# Patient Record
Sex: FEMALE | URBAN - NONMETROPOLITAN AREA
[De-identification: names, ages, dates, MRNs, and addresses within clinical notes are randomized per-mention and may not be internally consistent; named-entity substitution may affect disease eponyms.]

---

## 2017-10-12 ENCOUNTER — APPOINTMENT (OUTPATIENT)
Age: 72
Setting detail: DERMATOLOGY
End: 2017-10-12

## 2017-10-12 DIAGNOSIS — B07.8 OTHER VIRAL WARTS: ICD-10-CM

## 2017-10-12 DIAGNOSIS — L72.8 OTHER FOLLICULAR CYSTS OF THE SKIN AND SUBCUTANEOUS TISSUE: ICD-10-CM

## 2017-10-12 DIAGNOSIS — L82.1 OTHER SEBORRHEIC KERATOSIS: ICD-10-CM

## 2017-10-12 PROBLEM — D48.5 NEOPLASM OF UNCERTAIN BEHAVIOR OF SKIN: Status: ACTIVE | Noted: 2017-10-12

## 2017-10-12 PROCEDURE — 11100: CPT | Mod: 59

## 2017-10-12 PROCEDURE — OTHER COUNSELING: OTHER

## 2017-10-12 PROCEDURE — 17110 DESTRUCT B9 LESION 1-14: CPT

## 2017-10-12 PROCEDURE — OTHER BIOPSY BY PUNCH METHOD: OTHER

## 2017-10-12 PROCEDURE — 11101: CPT

## 2017-10-12 PROCEDURE — 99202 OFFICE O/P NEW SF 15 MIN: CPT | Mod: 25

## 2017-10-12 PROCEDURE — OTHER LIQUID NITROGEN: OTHER

## 2017-10-12 PROCEDURE — OTHER BIOPSY BY SHAVE METHOD: OTHER

## 2017-10-12 ASSESSMENT — LOCATION ZONE DERM
LOCATION ZONE: NOSE
LOCATION ZONE: TRUNK
LOCATION ZONE: ARM
LOCATION ZONE: FACE

## 2017-10-12 ASSESSMENT — LOCATION DETAILED DESCRIPTION DERM
LOCATION DETAILED: UPPER STERNUM
LOCATION DETAILED: LEFT ANTERIOR SHOULDER
LOCATION DETAILED: NASAL DORSUM
LOCATION DETAILED: RIGHT LATERAL SUPERIOR CHEST
LOCATION DETAILED: LEFT VENTRAL PROXIMAL FOREARM
LOCATION DETAILED: LEFT FOREHEAD

## 2017-10-12 ASSESSMENT — LOCATION SIMPLE DESCRIPTION DERM
LOCATION SIMPLE: LEFT FOREARM
LOCATION SIMPLE: LEFT SHOULDER
LOCATION SIMPLE: NOSE
LOCATION SIMPLE: LEFT FOREHEAD
LOCATION SIMPLE: CHEST

## 2017-10-12 NOTE — HPI: SKIN LESION
How Severe Is Your Skin Lesion?: mild
Has Your Skin Lesion Been Treated?: not been treated
Is This A New Presentation, Or A Follow-Up?: Skin Lesions
Which Family Member (Optional)?: Brother

## 2017-10-12 NOTE — PROCEDURE: BIOPSY BY SHAVE METHOD
Electrodesiccation Text: The wound bed was treated with electrodesiccation after the biopsy was performed.
Biopsy Method: 15 blade
Billing Type: Third-Party Bill
Additional Anesthesia Volume In Cc (Will Not Render If 0): 0
Hemostasis: Aluminum Chloride
Anesthesia Type: 1% lidocaine without epinephrine
Bill For Surgical Tray: no
Anesthesia Volume In Cc (Will Not Render If 0): 0.5
Biopsy Type: H and E
Wound Care: Bacitracin
Detail Level: Detailed
Consent: Written consent was obtained and risks were reviewed including but not limited to scarring, infection, bleeding, scabbing, incomplete removal, nerve damage and allergy to anesthesia.
Electrodesiccation And Curettage Text: The wound bed was treated with electrodesiccation and curettage after the biopsy was performed.
Cryotherapy Text: The wound bed was treated with cryotherapy after the biopsy was performed.
Dressing: bandage
Silver Nitrate Text: The wound bed was treated with silver nitrate after the biopsy was performed.
Type Of Destruction Used: Curettage
Curettage Text: The wound bed was treated with curettage after the biopsy was performed.
Post-Care Instructions: I reviewed with the patient in detail post-care instructions. Patient is to keep the biopsy site dry overnight, and then apply bacitracin twice daily until healed. Patient may cleanse with half peroxide and half water mixture
Notification Instructions: Patient will be notified of biopsy results. However, patient instructed to call the office if not contacted within 2 weeks.

## 2017-10-12 NOTE — PROCEDURE: BIOPSY BY PUNCH METHOD
Detail Level: Detailed
Render Post-Care Instructions In Note?: no
Notification Instructions: Patient will be notified of biopsy results. However, patient instructed to call the office if not contacted within 2 weeks.
Size Of Lesion In Cm (Optional): 0
Dressing: bandage
Punch Size In Mm: 2
Epidermal Sutures: 4-0 Nylon
Suture Removal: 10 days
Hemostasis: None
Billing Type: Third-Party Bill
Post-Care Instructions: I reviewed with the patient in detail post-care instructions. Patient is to keep the biopsy site dry overnight, and then apply bacitracin twice daily until healed. Patient may clean with half peroxide and half water mixture
Anesthesia Type: 1% lidocaine with epinephrine and a 1:10 solution of 8.4% sodium bicarbonate
Wound Care: Bacitracin
Biopsy Type: H and E
Home Suture Removal Text: Patient was provided a home suture removal kit and will remove their sutures at home.  If they have any questions or difficulties they will call the office.
Anesthesia Volume In Cc (Will Not Render If 0): 0.5
Consent: Written consent was obtained and risks were reviewed including but not limited to scarring, infection, bleeding, scabbing, incomplete removal, nerve damage and allergy to anesthesia.

## 2017-10-20 ENCOUNTER — APPOINTMENT (OUTPATIENT)
Age: 72
Setting detail: DERMATOLOGY
End: 2017-10-20

## 2017-10-20 DIAGNOSIS — Z48.02 ENCOUNTER FOR REMOVAL OF SUTURES: ICD-10-CM

## 2017-10-20 PROCEDURE — 99024 POSTOP FOLLOW-UP VISIT: CPT

## 2017-10-20 PROCEDURE — OTHER POST-OP WOUND EVALUATION: OTHER

## 2017-10-20 PROCEDURE — OTHER SUTURE REMOVAL (GLOBAL PERIOD): OTHER

## 2017-10-20 ASSESSMENT — LOCATION SIMPLE DESCRIPTION DERM
LOCATION SIMPLE: NOSE
LOCATION SIMPLE: LEFT FOREHEAD
LOCATION SIMPLE: RIGHT UPPER BACK

## 2017-10-20 ASSESSMENT — LOCATION ZONE DERM
LOCATION ZONE: NOSE
LOCATION ZONE: FACE
LOCATION ZONE: TRUNK

## 2017-10-20 ASSESSMENT — LOCATION DETAILED DESCRIPTION DERM
LOCATION DETAILED: NASAL ROOT
LOCATION DETAILED: LEFT FOREHEAD
LOCATION DETAILED: RIGHT SUPERIOR MEDIAL UPPER BACK

## 2017-10-20 NOTE — PROCEDURE: SUTURE REMOVAL (GLOBAL PERIOD)
Add 61736 Cpt? (Important Note: In 2017 The Use Of 15299 Is Being Tracked By Cms To Determine Future Global Period Reimbursement For Global Periods): yes
Detail Level: Detailed

## 2017-10-20 NOTE — PROCEDURE: POST-OP WOUND EVALUATION
Add 15338 Cpt? (Important Note: In 2017 The Use Of 12547 Is Being Tracked By Cms To Determine Future Global Period Reimbursement For Global Periods): yes
Detail Level: Detailed
Wound Crusting?: clean
Wound Diameter In Cm(Optional): 0
Patient To Follow-Up With?: our clinic

## 2018-02-16 ENCOUNTER — APPOINTMENT (OUTPATIENT)
Age: 73
Setting detail: DERMATOLOGY
End: 2018-02-16

## 2018-02-16 DIAGNOSIS — L60.8 OTHER NAIL DISORDERS: ICD-10-CM

## 2018-02-16 DIAGNOSIS — L60.3 NAIL DYSTROPHY: ICD-10-CM

## 2018-02-16 DIAGNOSIS — L81.4 OTHER MELANIN HYPERPIGMENTATION: ICD-10-CM

## 2018-02-16 DIAGNOSIS — B07.8 OTHER VIRAL WARTS: ICD-10-CM

## 2018-02-16 PROBLEM — L60.9 NAIL DISORDER, UNSPECIFIED: Status: ACTIVE | Noted: 2018-02-16

## 2018-02-16 PROCEDURE — OTHER NAIL CLIPPING FOR PAS: OTHER

## 2018-02-16 PROCEDURE — 17110 DESTRUCT B9 LESION 1-14: CPT

## 2018-02-16 PROCEDURE — OTHER COUNSELING: OTHER

## 2018-02-16 PROCEDURE — 99213 OFFICE O/P EST LOW 20 MIN: CPT | Mod: 25

## 2018-02-16 PROCEDURE — OTHER LIQUID NITROGEN: OTHER

## 2018-02-16 ASSESSMENT — LOCATION DETAILED DESCRIPTION DERM
LOCATION DETAILED: RIGHT INDEX FINGERNAIL
LOCATION DETAILED: RIGHT ELBOW
LOCATION DETAILED: RIGHT SUPERIOR MEDIAL MIDBACK
LOCATION DETAILED: RIGHT PROXIMAL DORSAL FOREARM
LOCATION DETAILED: LEFT THUMBNAIL
LOCATION DETAILED: RIGHT THUMBNAIL
LOCATION DETAILED: LEFT RING FINGER LUNULA
LOCATION DETAILED: LEFT POSTERIOR SHOULDER
LOCATION DETAILED: RIGHT THUMBNAIL
LOCATION DETAILED: LEFT PROXIMAL DORSAL FOREARM
LOCATION DETAILED: RIGHT DISTAL DORSAL FOREARM
LOCATION DETAILED: LEFT RING FINGER LUNULA
LOCATION DETAILED: RIGHT INDEX FINGERNAIL
LOCATION DETAILED: RIGHT CLAVICULAR SKIN
LOCATION DETAILED: LEFT INDEX FINGERNAIL
LOCATION DETAILED: LEFT INDEX FINGERNAIL

## 2018-02-16 ASSESSMENT — LOCATION ZONE DERM
LOCATION ZONE: FINGERNAIL
LOCATION ZONE: FINGERNAIL
LOCATION ZONE: TRUNK
LOCATION ZONE: ARM

## 2018-02-16 ASSESSMENT — LOCATION SIMPLE DESCRIPTION DERM
LOCATION SIMPLE: LEFT THUMBNAIL
LOCATION SIMPLE: LEFT SHOULDER
LOCATION SIMPLE: RIGHT CLAVICULAR SKIN
LOCATION SIMPLE: LEFT INDEX FINGERNAIL
LOCATION SIMPLE: RIGHT INDEX FINGERNAIL
LOCATION SIMPLE: LEFT FOREARM
LOCATION SIMPLE: RIGHT FOREARM
LOCATION SIMPLE: LEFT RING FINGERNAIL
LOCATION SIMPLE: RIGHT THUMBNAIL
LOCATION SIMPLE: RIGHT ELBOW
LOCATION SIMPLE: LEFT RING FINGERNAIL
LOCATION SIMPLE: RIGHT THUMBNAIL
LOCATION SIMPLE: LEFT INDEX FINGERNAIL
LOCATION SIMPLE: RIGHT LOWER BACK
LOCATION SIMPLE: RIGHT INDEX FINGERNAIL

## 2018-08-17 ENCOUNTER — APPOINTMENT (OUTPATIENT)
Age: 73
Setting detail: DERMATOLOGY
End: 2018-08-17

## 2018-08-17 DIAGNOSIS — L57.0 ACTINIC KERATOSIS: ICD-10-CM

## 2018-08-17 DIAGNOSIS — L81.4 OTHER MELANIN HYPERPIGMENTATION: ICD-10-CM

## 2018-08-17 PROCEDURE — 99213 OFFICE O/P EST LOW 20 MIN: CPT | Mod: 25

## 2018-08-17 PROCEDURE — OTHER COUNSELING: OTHER

## 2018-08-17 PROCEDURE — 17000 DESTRUCT PREMALG LESION: CPT

## 2018-08-17 PROCEDURE — OTHER LIQUID NITROGEN: OTHER

## 2018-08-17 ASSESSMENT — LOCATION SIMPLE DESCRIPTION DERM
LOCATION SIMPLE: RIGHT PRETIBIAL REGION
LOCATION SIMPLE: RIGHT FOREARM
LOCATION SIMPLE: RIGHT HAND
LOCATION SIMPLE: LEFT HAND
LOCATION SIMPLE: LEFT FOREARM

## 2018-08-17 ASSESSMENT — LOCATION DETAILED DESCRIPTION DERM
LOCATION DETAILED: RIGHT PROXIMAL DORSAL FOREARM
LOCATION DETAILED: LEFT PROXIMAL DORSAL FOREARM
LOCATION DETAILED: RIGHT RADIAL DORSAL HAND
LOCATION DETAILED: RIGHT PROXIMAL PRETIBIAL REGION
LOCATION DETAILED: LEFT ULNAR DORSAL HAND

## 2018-08-17 ASSESSMENT — LOCATION ZONE DERM
LOCATION ZONE: ARM
LOCATION ZONE: HAND
LOCATION ZONE: LEG

## 2018-08-17 NOTE — PROCEDURE: LIQUID NITROGEN
Number Of Freeze-Thaw Cycles: 1 freeze-thaw cycle
Consent: The patient's consent was obtained including but not limited to risks of crusting, scabbing, blistering, scarring, darker or lighter pigmentary change, recurrence, incomplete removal and infection.
Duration Of Freeze Thaw-Cycle (Seconds): 0
Detail Level: Simple
Render Post-Care Instructions In Note?: yes
Post-Care Instructions: I reviewed with the patient in detail post-care instructions. Patient is to wear sunprotection, and avoid picking at any of the treated lesions. Pt may apply Vaseline to crusted or scabbing areas.

## 2018-09-14 ENCOUNTER — APPOINTMENT (OUTPATIENT)
Age: 73
Setting detail: DERMATOLOGY
End: 2018-09-14

## 2018-09-14 DIAGNOSIS — L57.0 ACTINIC KERATOSIS: ICD-10-CM

## 2018-09-14 DIAGNOSIS — L82.1 OTHER SEBORRHEIC KERATOSIS: ICD-10-CM

## 2018-09-14 DIAGNOSIS — L81.4 OTHER MELANIN HYPERPIGMENTATION: ICD-10-CM

## 2018-09-14 PROCEDURE — OTHER COUNSELING: OTHER

## 2018-09-14 PROCEDURE — 99212 OFFICE O/P EST SF 10 MIN: CPT

## 2018-09-14 ASSESSMENT — LOCATION DETAILED DESCRIPTION DERM
LOCATION DETAILED: RIGHT PROXIMAL RADIAL DORSAL FOREARM
LOCATION DETAILED: RIGHT PROXIMAL POSTERIOR UPPER ARM
LOCATION DETAILED: RIGHT DISTAL PRETIBIAL REGION
LOCATION DETAILED: LEFT PROXIMAL DORSAL FOREARM

## 2018-09-14 ASSESSMENT — LOCATION SIMPLE DESCRIPTION DERM
LOCATION SIMPLE: RIGHT FOREARM
LOCATION SIMPLE: RIGHT POSTERIOR UPPER ARM
LOCATION SIMPLE: LEFT FOREARM
LOCATION SIMPLE: RIGHT PRETIBIAL REGION

## 2018-09-14 ASSESSMENT — LOCATION ZONE DERM
LOCATION ZONE: ARM
LOCATION ZONE: ARM
LOCATION ZONE: LEG